# Patient Record
Sex: FEMALE | Race: ASIAN | NOT HISPANIC OR LATINO
[De-identification: names, ages, dates, MRNs, and addresses within clinical notes are randomized per-mention and may not be internally consistent; named-entity substitution may affect disease eponyms.]

---

## 2020-10-21 ENCOUNTER — TRANSCRIPTION ENCOUNTER (OUTPATIENT)
Age: 35
End: 2020-10-21

## 2021-07-31 ENCOUNTER — APPOINTMENT (OUTPATIENT)
Dept: OBGYN | Facility: CLINIC | Age: 36
End: 2021-07-31
Payer: COMMERCIAL

## 2021-07-31 ENCOUNTER — NON-APPOINTMENT (OUTPATIENT)
Age: 36
End: 2021-07-31

## 2021-07-31 PROCEDURE — 99385 PREV VISIT NEW AGE 18-39: CPT

## 2021-07-31 PROCEDURE — 36415 COLL VENOUS BLD VENIPUNCTURE: CPT

## 2021-08-12 ENCOUNTER — APPOINTMENT (OUTPATIENT)
Dept: OBGYN | Facility: CLINIC | Age: 36
End: 2021-08-12
Payer: COMMERCIAL

## 2021-08-12 DIAGNOSIS — Z87.42 PERSONAL HISTORY OF OTHER DISEASES OF THE FEMALE GENITAL TRACT: ICD-10-CM

## 2021-08-12 DIAGNOSIS — O09.819 SUPERVISION OF PREGNANCY RESULTING FROM ASSISTED REPRODUCTIVE TECHNOLOGY, UNSPECIFIED TRIMESTER: ICD-10-CM

## 2021-08-12 DIAGNOSIS — Z78.9 OTHER SPECIFIED HEALTH STATUS: ICD-10-CM

## 2021-08-12 DIAGNOSIS — N91.1 SECONDARY AMENORRHEA: ICD-10-CM

## 2021-08-12 DIAGNOSIS — Z82.49 FAMILY HISTORY OF ISCHEMIC HEART DISEASE AND OTHER DISEASES OF THE CIRCULATORY SYSTEM: ICD-10-CM

## 2021-08-12 PROBLEM — Z00.00 ENCOUNTER FOR PREVENTIVE HEALTH EXAMINATION: Status: ACTIVE | Noted: 2021-08-12

## 2021-08-12 PROCEDURE — 76801 OB US < 14 WKS SINGLE FETUS: CPT

## 2021-08-12 RX ORDER — PROGESTERONE 200 MG/1
CAPSULE ORAL
Refills: 0 | Status: ACTIVE | COMMUNITY

## 2021-08-28 ENCOUNTER — APPOINTMENT (OUTPATIENT)
Dept: ANTEPARTUM | Facility: CLINIC | Age: 36
End: 2021-08-28
Payer: COMMERCIAL

## 2021-08-28 PROCEDURE — 76801 OB US < 14 WKS SINGLE FETUS: CPT

## 2021-08-28 PROCEDURE — 76816 OB US FOLLOW-UP PER FETUS: CPT

## 2021-08-28 PROCEDURE — 76813 OB US NUCHAL MEAS 1 GEST: CPT | Mod: 59

## 2021-09-25 ENCOUNTER — TRANSCRIPTION ENCOUNTER (OUTPATIENT)
Age: 36
End: 2021-09-25

## 2021-09-25 ENCOUNTER — APPOINTMENT (OUTPATIENT)
Dept: OBGYN | Facility: CLINIC | Age: 36
End: 2021-09-25

## 2021-09-25 VITALS — SYSTOLIC BLOOD PRESSURE: 102 MMHG | WEIGHT: 116 LBS | DIASTOLIC BLOOD PRESSURE: 66 MMHG

## 2021-09-25 DIAGNOSIS — Z34.00 ENCOUNTER FOR SUPERVISION OF NORMAL FIRST PREGNANCY, UNSPECIFIED TRIMESTER: ICD-10-CM

## 2021-09-30 LAB
2ND TRIMESTER DATA: NORMAL
AFP PNL SERPL: NORMAL
AFP SERPL-ACNC: NORMAL
CLINICAL BIOCHEMIST REVIEW: NORMAL
NOTES NTD: NORMAL

## 2021-10-25 ENCOUNTER — APPOINTMENT (OUTPATIENT)
Dept: ANTEPARTUM | Facility: CLINIC | Age: 36
End: 2021-10-25
Payer: COMMERCIAL

## 2021-10-25 ENCOUNTER — APPOINTMENT (OUTPATIENT)
Dept: OBGYN | Facility: CLINIC | Age: 36
End: 2021-10-25
Payer: COMMERCIAL

## 2021-10-25 VITALS
WEIGHT: 128 LBS | BODY MASS INDEX: 22.68 KG/M2 | DIASTOLIC BLOOD PRESSURE: 65 MMHG | SYSTOLIC BLOOD PRESSURE: 101 MMHG | HEIGHT: 63 IN

## 2021-10-25 PROCEDURE — 90686 IIV4 VACC NO PRSV 0.5 ML IM: CPT

## 2021-10-25 PROCEDURE — 90471 IMMUNIZATION ADMIN: CPT

## 2021-10-25 PROCEDURE — ZZZZZ: CPT

## 2021-10-25 PROCEDURE — 76811 OB US DETAILED SNGL FETUS: CPT

## 2021-11-20 ENCOUNTER — APPOINTMENT (OUTPATIENT)
Dept: OBGYN | Facility: CLINIC | Age: 36
End: 2021-11-20

## 2021-11-20 VITALS
SYSTOLIC BLOOD PRESSURE: 99 MMHG | WEIGHT: 131 LBS | DIASTOLIC BLOOD PRESSURE: 66 MMHG | HEIGHT: 63 IN | BODY MASS INDEX: 23.21 KG/M2

## 2021-11-22 LAB
BASOPHILS # BLD AUTO: 0.04 K/UL
BASOPHILS NFR BLD AUTO: 0.5 %
EOSINOPHIL # BLD AUTO: 0.17 K/UL
EOSINOPHIL NFR BLD AUTO: 2 %
GLUCOSE 1H P 50 G GLC PO SERPL-MCNC: 91 MG/DL
HCT VFR BLD CALC: 34.1 %
HGB BLD-MCNC: 11.5 G/DL
IMM GRANULOCYTES NFR BLD AUTO: 0.5 %
LYMPHOCYTES # BLD AUTO: 1.14 K/UL
LYMPHOCYTES NFR BLD AUTO: 13.7 %
MAN DIFF?: NORMAL
MCHC RBC-ENTMCNC: 32.2 PG
MCHC RBC-ENTMCNC: 33.7 GM/DL
MCV RBC AUTO: 95.5 FL
MONOCYTES # BLD AUTO: 0.44 K/UL
MONOCYTES NFR BLD AUTO: 5.3 %
NEUTROPHILS # BLD AUTO: 6.48 K/UL
NEUTROPHILS NFR BLD AUTO: 78 %
PLATELET # BLD AUTO: 141 K/UL
RBC # BLD: 3.57 M/UL
RBC # FLD: 13.4 %
WBC # FLD AUTO: 8.31 K/UL

## 2021-12-18 ENCOUNTER — APPOINTMENT (OUTPATIENT)
Dept: OBGYN | Facility: CLINIC | Age: 36
End: 2021-12-18

## 2021-12-18 VITALS — SYSTOLIC BLOOD PRESSURE: 103 MMHG | BODY MASS INDEX: 23.91 KG/M2 | WEIGHT: 135 LBS | DIASTOLIC BLOOD PRESSURE: 69 MMHG

## 2021-12-27 ENCOUNTER — TRANSCRIPTION ENCOUNTER (OUTPATIENT)
Age: 36
End: 2021-12-27

## 2022-01-04 ENCOUNTER — APPOINTMENT (OUTPATIENT)
Dept: ANTEPARTUM | Facility: CLINIC | Age: 37
End: 2022-01-04
Payer: COMMERCIAL

## 2022-01-04 ENCOUNTER — APPOINTMENT (OUTPATIENT)
Dept: OBGYN | Facility: CLINIC | Age: 37
End: 2022-01-04
Payer: COMMERCIAL

## 2022-01-04 VITALS
HEIGHT: 63 IN | BODY MASS INDEX: 24.8 KG/M2 | WEIGHT: 140 LBS | DIASTOLIC BLOOD PRESSURE: 70 MMHG | SYSTOLIC BLOOD PRESSURE: 103 MMHG

## 2022-01-04 PROCEDURE — XXXXX: CPT

## 2022-01-04 PROCEDURE — 76819 FETAL BIOPHYS PROFIL W/O NST: CPT

## 2022-01-04 PROCEDURE — 76816 OB US FOLLOW-UP PER FETUS: CPT

## 2022-01-18 ENCOUNTER — APPOINTMENT (OUTPATIENT)
Dept: OBGYN | Facility: CLINIC | Age: 37
End: 2022-01-18
Payer: COMMERCIAL

## 2022-01-18 VITALS
BODY MASS INDEX: 25.16 KG/M2 | SYSTOLIC BLOOD PRESSURE: 108 MMHG | DIASTOLIC BLOOD PRESSURE: 70 MMHG | HEIGHT: 63 IN | WEIGHT: 142 LBS

## 2022-01-18 PROCEDURE — 90471 IMMUNIZATION ADMIN: CPT

## 2022-01-18 PROCEDURE — 90715 TDAP VACCINE 7 YRS/> IM: CPT

## 2022-02-03 ENCOUNTER — APPOINTMENT (OUTPATIENT)
Dept: OBGYN | Facility: CLINIC | Age: 37
End: 2022-02-03
Payer: COMMERCIAL

## 2022-02-03 VITALS
HEIGHT: 63 IN | WEIGHT: 147 LBS | SYSTOLIC BLOOD PRESSURE: 118 MMHG | DIASTOLIC BLOOD PRESSURE: 75 MMHG | BODY MASS INDEX: 26.05 KG/M2

## 2022-02-03 PROCEDURE — XXXXX: CPT

## 2022-02-17 ENCOUNTER — APPOINTMENT (OUTPATIENT)
Dept: OBGYN | Facility: CLINIC | Age: 37
End: 2022-02-17
Payer: COMMERCIAL

## 2022-02-17 VITALS
BODY MASS INDEX: 26.22 KG/M2 | SYSTOLIC BLOOD PRESSURE: 105 MMHG | HEIGHT: 63 IN | DIASTOLIC BLOOD PRESSURE: 72 MMHG | WEIGHT: 148 LBS

## 2022-02-17 DIAGNOSIS — Z3A.36 36 WEEKS GESTATION OF PREGNANCY: ICD-10-CM

## 2022-02-17 PROCEDURE — 36415 COLL VENOUS BLD VENIPUNCTURE: CPT

## 2022-02-17 PROCEDURE — 0502F SUBSEQUENT PRENATAL CARE: CPT

## 2022-02-22 LAB
B-HEM STREP SPEC QL CULT: NORMAL
HIV1+2 AB SPEC QL IA.RAPID: NONREACTIVE

## 2022-03-01 ENCOUNTER — APPOINTMENT (OUTPATIENT)
Dept: ANTEPARTUM | Facility: CLINIC | Age: 37
End: 2022-03-01
Payer: COMMERCIAL

## 2022-03-01 ENCOUNTER — APPOINTMENT (OUTPATIENT)
Dept: OBGYN | Facility: CLINIC | Age: 37
End: 2022-03-01
Payer: COMMERCIAL

## 2022-03-01 VITALS
SYSTOLIC BLOOD PRESSURE: 118 MMHG | DIASTOLIC BLOOD PRESSURE: 79 MMHG | BODY MASS INDEX: 26.58 KG/M2 | HEIGHT: 63 IN | WEIGHT: 150 LBS

## 2022-03-01 PROCEDURE — XXXXX: CPT

## 2022-03-01 PROCEDURE — 76816 OB US FOLLOW-UP PER FETUS: CPT

## 2022-03-01 PROCEDURE — 76819 FETAL BIOPHYS PROFIL W/O NST: CPT

## 2022-03-07 ENCOUNTER — INPATIENT (INPATIENT)
Facility: HOSPITAL | Age: 37
LOS: 4 days | Discharge: HOME | End: 2022-03-12
Attending: OBSTETRICS & GYNECOLOGY | Admitting: OBSTETRICS & GYNECOLOGY
Payer: COMMERCIAL

## 2022-03-07 ENCOUNTER — NON-APPOINTMENT (OUTPATIENT)
Age: 37
End: 2022-03-07

## 2022-03-07 VITALS
TEMPERATURE: 98 F | HEART RATE: 94 BPM | DIASTOLIC BLOOD PRESSURE: 70 MMHG | RESPIRATION RATE: 16 BRPM | OXYGEN SATURATION: 99 % | SYSTOLIC BLOOD PRESSURE: 113 MMHG

## 2022-03-07 DIAGNOSIS — O42.92 FULL-TERM PREMATURE RUPTURE OF MEMBRANES, UNSPECIFIED AS TO LENGTH OF TIME BETWEEN RUPTURE AND ONSET OF LABOR: ICD-10-CM

## 2022-03-07 DIAGNOSIS — Z3A.00 WEEKS OF GESTATION OF PREGNANCY NOT SPECIFIED: ICD-10-CM

## 2022-03-07 DIAGNOSIS — O26.899 OTHER SPECIFIED PREGNANCY RELATED CONDITIONS, UNSPECIFIED TRIMESTER: ICD-10-CM

## 2022-03-07 LAB
BASOPHILS # BLD AUTO: 0.03 K/UL — SIGNIFICANT CHANGE UP (ref 0–0.2)
BASOPHILS NFR BLD AUTO: 0.3 % — SIGNIFICANT CHANGE UP (ref 0–2)
BLD GP AB SCN SERPL QL: NEGATIVE — SIGNIFICANT CHANGE UP
EOSINOPHIL # BLD AUTO: 0.07 K/UL — SIGNIFICANT CHANGE UP (ref 0–0.5)
EOSINOPHIL NFR BLD AUTO: 0.8 % — SIGNIFICANT CHANGE UP (ref 0–6)
HCT VFR BLD CALC: 39.2 % — SIGNIFICANT CHANGE UP (ref 34.5–45)
HGB BLD-MCNC: 13.3 G/DL — SIGNIFICANT CHANGE UP (ref 11.5–15.5)
IANC: 6.61 K/UL — SIGNIFICANT CHANGE UP (ref 1.5–8.5)
IMM GRANULOCYTES NFR BLD AUTO: 0.6 % — SIGNIFICANT CHANGE UP (ref 0–1.5)
LYMPHOCYTES # BLD AUTO: 1.42 K/UL — SIGNIFICANT CHANGE UP (ref 1–3.3)
LYMPHOCYTES # BLD AUTO: 16.6 % — SIGNIFICANT CHANGE UP (ref 13–44)
MCHC RBC-ENTMCNC: 31.9 PG — SIGNIFICANT CHANGE UP (ref 27–34)
MCHC RBC-ENTMCNC: 33.9 GM/DL — SIGNIFICANT CHANGE UP (ref 32–36)
MCV RBC AUTO: 94 FL — SIGNIFICANT CHANGE UP (ref 80–100)
MONOCYTES # BLD AUTO: 0.4 K/UL — SIGNIFICANT CHANGE UP (ref 0–0.9)
MONOCYTES NFR BLD AUTO: 4.7 % — SIGNIFICANT CHANGE UP (ref 2–14)
NEUTROPHILS # BLD AUTO: 6.61 K/UL — SIGNIFICANT CHANGE UP (ref 1.8–7.4)
NEUTROPHILS NFR BLD AUTO: 77 % — SIGNIFICANT CHANGE UP (ref 43–77)
NRBC # BLD: 0 /100 WBCS — SIGNIFICANT CHANGE UP
NRBC # FLD: 0 K/UL — SIGNIFICANT CHANGE UP
PLATELET # BLD AUTO: 107 K/UL — LOW (ref 150–400)
RBC # BLD: 4.17 M/UL — SIGNIFICANT CHANGE UP (ref 3.8–5.2)
RBC # FLD: 13.3 % — SIGNIFICANT CHANGE UP (ref 10.3–14.5)
RH IG SCN BLD-IMP: POSITIVE — SIGNIFICANT CHANGE UP
RH IG SCN BLD-IMP: POSITIVE — SIGNIFICANT CHANGE UP
SARS-COV-2 RNA SPEC QL NAA+PROBE: SIGNIFICANT CHANGE UP
WBC # BLD: 8.58 K/UL — SIGNIFICANT CHANGE UP (ref 3.8–10.5)
WBC # FLD AUTO: 8.58 K/UL — SIGNIFICANT CHANGE UP (ref 3.8–10.5)

## 2022-03-07 RX ORDER — OXYTOCIN 10 UNIT/ML
333.33 VIAL (ML) INJECTION
Qty: 20 | Refills: 0 | Status: DISCONTINUED | OUTPATIENT
Start: 2022-03-07 | End: 2022-03-10

## 2022-03-07 RX ORDER — SODIUM CHLORIDE 9 MG/ML
1000 INJECTION, SOLUTION INTRAVENOUS
Refills: 0 | Status: DISCONTINUED | OUTPATIENT
Start: 2022-03-07 | End: 2022-03-09

## 2022-03-07 RX ORDER — OXYTOCIN 10 UNIT/ML
333.33 VIAL (ML) INJECTION
Qty: 20 | Refills: 0 | Status: DISCONTINUED | OUTPATIENT
Start: 2022-03-07 | End: 2022-03-07

## 2022-03-07 RX ORDER — SODIUM CHLORIDE 9 MG/ML
1000 INJECTION, SOLUTION INTRAVENOUS
Refills: 0 | Status: DISCONTINUED | OUTPATIENT
Start: 2022-03-07 | End: 2022-03-07

## 2022-03-07 RX ADMIN — SODIUM CHLORIDE 125 MILLILITER(S): 9 INJECTION, SOLUTION INTRAVENOUS at 18:52

## 2022-03-07 NOTE — OB RN TRIAGE NOTE - FALL HARM RISK - UNIVERSAL INTERVENTIONS
Bed in lowest position, wheels locked, appropriate side rails in place/Call bell, personal items and telephone in reach/Instruct patient to call for assistance before getting out of bed or chair/Non-slip footwear when patient is out of bed/[place] to call system/Physically safe environment - no spills, clutter or unnecessary equipment/Purposeful Proactive Rounding/Room/bathroom lighting operational, light cord in reach

## 2022-03-07 NOTE — OB RN PATIENT PROFILE - FALL HARM RISK - UNIVERSAL INTERVENTIONS
Bed in lowest position, wheels locked, appropriate side rails in place/Call bell, personal items and telephone in reach/Instruct patient to call for assistance before getting out of bed or chair/Non-slip footwear when patient is out of bed/[place] to call system/Physically safe environment - no spills, clutter or unnecessary equipment/Purposeful Proactive Rounding/Room/bathroom lighting operational, light cord in reach

## 2022-03-07 NOTE — OB PROVIDER TRIAGE NOTE - NSHPPHYSICALEXAM_GEN_ALL_CORE
Assessment reveals VSS, abdomen soft, NT, gravid.   SSE- clear mucus discharge noted coming from is. Appears 1cm dilated. Neg pooling, positive nitrizine, positive fern.   VE 1/50/-3  Vtx confirmed by US  EFW 8.0 by palp  Cat 1 FHT, occasional ctx on toco    Plan D/W patient, desires to not be induced at this time.   Dr. Kim to come and talk to patient.

## 2022-03-07 NOTE — OB PROVIDER TRIAGE NOTE - HISTORY OF PRESENT ILLNESS
37yo  @ 39.2 presents with c/o watery/bloody discharge since 0245am. Denies ctx and reports GFM.  Denies H/O COVID-19  Vaccinated x 3    Denies PMH/PSH

## 2022-03-07 NOTE — OB RN TRIAGE NOTE - CHIEF COMPLAINT QUOTE
spotting + fm fh in quick look 126 spotting since 2:45am + fm fh in quick look 126; small gush of fluid here in bathroom @12:45pm

## 2022-03-07 NOTE — OB PROVIDER H&P - ASSESSMENT
Admit for PPROM  Dr. Kim to discuss plan of care with patient.  Await labor at this time.   COVID-19 PCR  Pain management PRN  D/W Dr. Kim

## 2022-03-07 NOTE — OB PROVIDER H&P - HISTORY OF PRESENT ILLNESS
35yo  @ 39.2 presents with c/o watery/bloody discharge since 0245am. Denies ctx and reports GFM.  Denies H/O COVID-19  Vaccinated x 3    Denies PMH/PSH

## 2022-03-08 ENCOUNTER — TRANSCRIPTION ENCOUNTER (OUTPATIENT)
Age: 37
End: 2022-03-08

## 2022-03-08 ENCOUNTER — APPOINTMENT (OUTPATIENT)
Dept: OBGYN | Facility: CLINIC | Age: 37
End: 2022-03-08

## 2022-03-08 LAB
COVID-19 SPIKE DOMAIN AB INTERP: POSITIVE
COVID-19 SPIKE DOMAIN ANTIBODY RESULT: >250 U/ML — HIGH
HBV SURFACE AG SERPL QL IA: SIGNIFICANT CHANGE UP
HCT VFR BLD CALC: 38 % — SIGNIFICANT CHANGE UP (ref 34.5–45)
HGB BLD-MCNC: 12.7 G/DL — SIGNIFICANT CHANGE UP (ref 11.5–15.5)
MCHC RBC-ENTMCNC: 31.3 PG — SIGNIFICANT CHANGE UP (ref 27–34)
MCHC RBC-ENTMCNC: 33.4 GM/DL — SIGNIFICANT CHANGE UP (ref 32–36)
MCV RBC AUTO: 93.6 FL — SIGNIFICANT CHANGE UP (ref 80–100)
NRBC # BLD: 0 /100 WBCS — SIGNIFICANT CHANGE UP
NRBC # FLD: 0 K/UL — SIGNIFICANT CHANGE UP
PLATELET # BLD AUTO: 101 K/UL — LOW (ref 150–400)
RBC # BLD: 4.06 M/UL — SIGNIFICANT CHANGE UP (ref 3.8–5.2)
RBC # FLD: 13.3 % — SIGNIFICANT CHANGE UP (ref 10.3–14.5)
SARS-COV-2 IGG+IGM SERPL QL IA: >250 U/ML — HIGH
SARS-COV-2 IGG+IGM SERPL QL IA: POSITIVE
T PALLIDUM AB TITR SER: NEGATIVE — SIGNIFICANT CHANGE UP
WBC # BLD: 8.99 K/UL — SIGNIFICANT CHANGE UP (ref 3.8–10.5)
WBC # FLD AUTO: 8.99 K/UL — SIGNIFICANT CHANGE UP (ref 3.8–10.5)

## 2022-03-08 RX ORDER — GENTAMICIN SULFATE 40 MG/ML
340 VIAL (ML) INJECTION ONCE
Refills: 0 | Status: COMPLETED | OUTPATIENT
Start: 2022-03-08 | End: 2022-03-08

## 2022-03-08 RX ORDER — AMPICILLIN TRIHYDRATE 250 MG
2 CAPSULE ORAL EVERY 6 HOURS
Refills: 0 | Status: DISCONTINUED | OUTPATIENT
Start: 2022-03-09 | End: 2022-03-09

## 2022-03-08 RX ORDER — DIPHENHYDRAMINE HCL 50 MG
25 CAPSULE ORAL ONCE
Refills: 0 | Status: COMPLETED | OUTPATIENT
Start: 2022-03-08 | End: 2022-03-08

## 2022-03-08 RX ORDER — ACETAMINOPHEN 500 MG
975 TABLET ORAL ONCE
Refills: 0 | Status: COMPLETED | OUTPATIENT
Start: 2022-03-08 | End: 2022-03-08

## 2022-03-08 RX ORDER — AMPICILLIN TRIHYDRATE 250 MG
2 CAPSULE ORAL ONCE
Refills: 0 | Status: COMPLETED | OUTPATIENT
Start: 2022-03-08 | End: 2022-03-08

## 2022-03-08 RX ORDER — AMPICILLIN TRIHYDRATE 250 MG
CAPSULE ORAL
Refills: 0 | Status: DISCONTINUED | OUTPATIENT
Start: 2022-03-08 | End: 2022-03-09

## 2022-03-08 RX ORDER — GENTAMICIN SULFATE 40 MG/ML
80 VIAL (ML) INJECTION EVERY 8 HOURS
Refills: 0 | Status: DISCONTINUED | OUTPATIENT
Start: 2022-03-08 | End: 2022-03-08

## 2022-03-08 RX ORDER — OXYTOCIN 10 UNIT/ML
2 VIAL (ML) INJECTION
Qty: 30 | Refills: 0 | Status: DISCONTINUED | OUTPATIENT
Start: 2022-03-08 | End: 2022-03-09

## 2022-03-08 RX ADMIN — Medication 975 MILLIGRAM(S): at 18:21

## 2022-03-08 RX ADMIN — Medication 25 MILLIGRAM(S): at 23:54

## 2022-03-08 RX ADMIN — Medication 300 MILLIGRAM(S): at 19:50

## 2022-03-08 RX ADMIN — Medication 2 MILLIUNIT(S)/MIN: at 05:05

## 2022-03-08 RX ADMIN — Medication 975 MILLIGRAM(S): at 17:56

## 2022-03-08 RX ADMIN — SODIUM CHLORIDE 125 MILLILITER(S): 9 INJECTION, SOLUTION INTRAVENOUS at 17:58

## 2022-03-08 RX ADMIN — Medication 216 GRAM(S): at 18:45

## 2022-03-09 ENCOUNTER — RESULT REVIEW (OUTPATIENT)
Age: 37
End: 2022-03-09

## 2022-03-09 ENCOUNTER — TRANSCRIPTION ENCOUNTER (OUTPATIENT)
Age: 37
End: 2022-03-09

## 2022-03-09 LAB
HCT VFR BLD CALC: 24.7 % — LOW (ref 34.5–45)
HCT VFR BLD CALC: 33.4 % — LOW (ref 34.5–45)
HCT VFR BLD CALC: 36.9 % — SIGNIFICANT CHANGE UP (ref 34.5–45)
HGB BLD-MCNC: 11.5 G/DL — SIGNIFICANT CHANGE UP (ref 11.5–15.5)
HGB BLD-MCNC: 12 G/DL — SIGNIFICANT CHANGE UP (ref 11.5–15.5)
HGB BLD-MCNC: 7.6 G/DL — LOW (ref 11.5–15.5)
MCHC RBC-ENTMCNC: 24.2 PG — LOW (ref 27–34)
MCHC RBC-ENTMCNC: 30.8 GM/DL — LOW (ref 32–36)
MCHC RBC-ENTMCNC: 31.3 PG — SIGNIFICANT CHANGE UP (ref 27–34)
MCHC RBC-ENTMCNC: 31.7 PG — SIGNIFICANT CHANGE UP (ref 27–34)
MCHC RBC-ENTMCNC: 32.5 GM/DL — SIGNIFICANT CHANGE UP (ref 32–36)
MCHC RBC-ENTMCNC: 34.4 GM/DL — SIGNIFICANT CHANGE UP (ref 32–36)
MCV RBC AUTO: 78.7 FL — LOW (ref 80–100)
MCV RBC AUTO: 92 FL — SIGNIFICANT CHANGE UP (ref 80–100)
MCV RBC AUTO: 96.3 FL — SIGNIFICANT CHANGE UP (ref 80–100)
NRBC # BLD: 0 /100 WBCS — SIGNIFICANT CHANGE UP
NRBC # FLD: 0 K/UL — SIGNIFICANT CHANGE UP
NRBC # FLD: 0 K/UL — SIGNIFICANT CHANGE UP
NRBC # FLD: 0.04 K/UL — HIGH
PLATELET # BLD AUTO: 130 K/UL — LOW (ref 150–400)
PLATELET # BLD AUTO: 88 K/UL — LOW (ref 150–400)
PLATELET # BLD AUTO: 89 K/UL — LOW (ref 150–400)
RBC # BLD: 3.14 M/UL — LOW (ref 3.8–5.2)
RBC # BLD: 3.63 M/UL — LOW (ref 3.8–5.2)
RBC # BLD: 3.83 M/UL — SIGNIFICANT CHANGE UP (ref 3.8–5.2)
RBC # FLD: 13.6 % — SIGNIFICANT CHANGE UP (ref 10.3–14.5)
RBC # FLD: 13.8 % — SIGNIFICANT CHANGE UP (ref 10.3–14.5)
RBC # FLD: 17.8 % — HIGH (ref 10.3–14.5)
WBC # BLD: 10.12 K/UL — SIGNIFICANT CHANGE UP (ref 3.8–10.5)
WBC # BLD: 11.55 K/UL — HIGH (ref 3.8–10.5)
WBC # BLD: 9.34 K/UL — SIGNIFICANT CHANGE UP (ref 3.8–10.5)
WBC # FLD AUTO: 10.12 K/UL — SIGNIFICANT CHANGE UP (ref 3.8–10.5)
WBC # FLD AUTO: 11.55 K/UL — HIGH (ref 3.8–10.5)
WBC # FLD AUTO: 9.34 K/UL — SIGNIFICANT CHANGE UP (ref 3.8–10.5)

## 2022-03-09 PROCEDURE — 59514 CESAREAN DELIVERY ONLY: CPT | Mod: AS,U9

## 2022-03-09 PROCEDURE — 88307 TISSUE EXAM BY PATHOLOGIST: CPT | Mod: 26

## 2022-03-09 DEVICE — SURGICEL FIBRILLAR 1 X 2": Type: IMPLANTABLE DEVICE | Status: FUNCTIONAL

## 2022-03-09 RX ORDER — SODIUM CHLORIDE 9 MG/ML
1000 INJECTION, SOLUTION INTRAVENOUS
Refills: 0 | Status: DISCONTINUED | OUTPATIENT
Start: 2022-03-09 | End: 2022-03-10

## 2022-03-09 RX ORDER — LANOLIN
1 OINTMENT (GRAM) TOPICAL EVERY 6 HOURS
Refills: 0 | Status: DISCONTINUED | OUTPATIENT
Start: 2022-03-09 | End: 2022-03-12

## 2022-03-09 RX ORDER — SIMETHICONE 80 MG/1
80 TABLET, CHEWABLE ORAL EVERY 4 HOURS
Refills: 0 | Status: DISCONTINUED | OUTPATIENT
Start: 2022-03-09 | End: 2022-03-12

## 2022-03-09 RX ORDER — ERTAPENEM SODIUM 1 G/1
1000 INJECTION, POWDER, LYOPHILIZED, FOR SOLUTION INTRAMUSCULAR; INTRAVENOUS EVERY 24 HOURS
Refills: 0 | Status: COMPLETED | OUTPATIENT
Start: 2022-03-09 | End: 2022-03-10

## 2022-03-09 RX ORDER — MAGNESIUM HYDROXIDE 400 MG/1
30 TABLET, CHEWABLE ORAL
Refills: 0 | Status: DISCONTINUED | OUTPATIENT
Start: 2022-03-09 | End: 2022-03-12

## 2022-03-09 RX ORDER — ACETAMINOPHEN 500 MG
975 TABLET ORAL
Refills: 0 | Status: DISCONTINUED | OUTPATIENT
Start: 2022-03-09 | End: 2022-03-12

## 2022-03-09 RX ORDER — OXYTOCIN 10 UNIT/ML
333.33 VIAL (ML) INJECTION
Qty: 20 | Refills: 0 | Status: DISCONTINUED | OUTPATIENT
Start: 2022-03-09 | End: 2022-03-10

## 2022-03-09 RX ORDER — CITRIC ACID/SODIUM CITRATE 300-500 MG
30 SOLUTION, ORAL ORAL ONCE
Refills: 0 | Status: COMPLETED | OUTPATIENT
Start: 2022-03-09 | End: 2022-03-09

## 2022-03-09 RX ORDER — OXYCODONE HYDROCHLORIDE 5 MG/1
5 TABLET ORAL
Refills: 0 | Status: DISCONTINUED | OUTPATIENT
Start: 2022-03-09 | End: 2022-03-12

## 2022-03-09 RX ORDER — HEPARIN SODIUM 5000 [USP'U]/ML
5000 INJECTION INTRAVENOUS; SUBCUTANEOUS EVERY 12 HOURS
Refills: 0 | Status: DISCONTINUED | OUTPATIENT
Start: 2022-03-09 | End: 2022-03-12

## 2022-03-09 RX ORDER — DIPHENHYDRAMINE HCL 50 MG
25 CAPSULE ORAL EVERY 6 HOURS
Refills: 0 | Status: DISCONTINUED | OUTPATIENT
Start: 2022-03-09 | End: 2022-03-12

## 2022-03-09 RX ORDER — OXYCODONE HYDROCHLORIDE 5 MG/1
5 TABLET ORAL ONCE
Refills: 0 | Status: DISCONTINUED | OUTPATIENT
Start: 2022-03-09 | End: 2022-03-12

## 2022-03-09 RX ORDER — OXYTOCIN 10 UNIT/ML
2 VIAL (ML) INJECTION
Qty: 30 | Refills: 0 | Status: DISCONTINUED | OUTPATIENT
Start: 2022-03-09 | End: 2022-03-09

## 2022-03-09 RX ORDER — FAMOTIDINE 10 MG/ML
20 INJECTION INTRAVENOUS ONCE
Refills: 0 | Status: COMPLETED | OUTPATIENT
Start: 2022-03-09 | End: 2022-03-09

## 2022-03-09 RX ORDER — IBUPROFEN 200 MG
600 TABLET ORAL EVERY 6 HOURS
Refills: 0 | Status: COMPLETED | OUTPATIENT
Start: 2022-03-09 | End: 2023-02-05

## 2022-03-09 RX ORDER — KETOROLAC TROMETHAMINE 30 MG/ML
30 SYRINGE (ML) INJECTION EVERY 6 HOURS
Refills: 0 | Status: DISCONTINUED | OUTPATIENT
Start: 2022-03-09 | End: 2022-03-10

## 2022-03-09 RX ORDER — TETANUS TOXOID, REDUCED DIPHTHERIA TOXOID AND ACELLULAR PERTUSSIS VACCINE, ADSORBED 5; 2.5; 8; 8; 2.5 [IU]/.5ML; [IU]/.5ML; UG/.5ML; UG/.5ML; UG/.5ML
0.5 SUSPENSION INTRAMUSCULAR ONCE
Refills: 0 | Status: DISCONTINUED | OUTPATIENT
Start: 2022-03-09 | End: 2022-03-12

## 2022-03-09 RX ADMIN — Medication 2 MILLIUNIT(S)/MIN: at 00:56

## 2022-03-09 RX ADMIN — ERTAPENEM SODIUM 120 MILLIGRAM(S): 1 INJECTION, POWDER, LYOPHILIZED, FOR SOLUTION INTRAMUSCULAR; INTRAVENOUS at 13:00

## 2022-03-09 RX ADMIN — Medication 30 MILLIGRAM(S): at 12:40

## 2022-03-09 RX ADMIN — SODIUM CHLORIDE 75 MILLILITER(S): 9 INJECTION, SOLUTION INTRAVENOUS at 11:03

## 2022-03-09 RX ADMIN — Medication 975 MILLIGRAM(S): at 20:31

## 2022-03-09 RX ADMIN — Medication 30 MILLIGRAM(S): at 18:02

## 2022-03-09 RX ADMIN — Medication 1000 MILLIUNIT(S)/MIN: at 11:03

## 2022-03-09 RX ADMIN — Medication 975 MILLIGRAM(S): at 21:00

## 2022-03-09 RX ADMIN — Medication 30 MILLILITER(S): at 09:26

## 2022-03-09 RX ADMIN — Medication 216 GRAM(S): at 00:45

## 2022-03-09 RX ADMIN — HEPARIN SODIUM 5000 UNIT(S): 5000 INJECTION INTRAVENOUS; SUBCUTANEOUS at 17:30

## 2022-03-09 RX ADMIN — Medication 216 GRAM(S): at 06:45

## 2022-03-09 RX ADMIN — Medication 2 MILLIUNIT(S)/MIN: at 06:50

## 2022-03-09 RX ADMIN — FAMOTIDINE 20 MILLIGRAM(S): 10 INJECTION INTRAVENOUS at 09:26

## 2022-03-09 RX ADMIN — SODIUM CHLORIDE 125 MILLILITER(S): 9 INJECTION, SOLUTION INTRAVENOUS at 00:57

## 2022-03-09 NOTE — OB PROVIDER DELIVERY SUMMARY - NSPROVIDERDELIVERYNOTE_OBGYN_ALL_OB_FT
Viable female infant, apgar 9/9, weight gms  delivered @10:05  cephalic presentation; clear copious fluid noted  hysterotomy closed in single layer   Fibrillar placed over hysterotomy, fascia layer reapproximated  otherwise grossly normal uterus, tubes & ovaries    QBL: 683 cc  UOP: 300 cc  IVF: 2700 cc  Dictation Number: [id number] Viable female infant, apgar 9/9, weight 3060 gms  delivered @10:05  cephalic presentation; clear copious fluid noted  hysterotomy closed in single layer   Fibrillar placed over hysterotomy, fascia layer reapproximated  otherwise grossly normal uterus, tubes & ovaries    QBL: 683 cc  UOP: 300 cc  IVF: 2700 cc  Dictation Number: [id number]

## 2022-03-09 NOTE — OB RN DELIVERY SUMMARY - NSSELHIDDEN_OBGYN_ALL_OB_FT
[NS_DeliveryAttending1_OBGYN_ALL_OB_FT:[id number]],[NS_DeliveryAssist1_OBGYN_ALL_OB_FT:[id number]],[NS_DeliveryRN_OBGYN_ALL_OB_FT:[id number]]

## 2022-03-09 NOTE — DISCHARGE NOTE OB - CARE PROVIDER_API CALL
Evin Kim)  MaternalFetal Medicine; Obstetrics and Gynecology  [address] [place] [id number]  Phone: [phone]  Fax: [phone]  Follow Up Time:

## 2022-03-09 NOTE — OB PROVIDER LABOR PROGRESS NOTE - NS_SUBJECTIVE/OBJECTIVE_OBGYN_ALL_OB_FT
Patient evaluated bedside for labor progression. 1x deceleration noted while flat on back during examination, recovered with pausing of pitocin and lateral positioning.
Pt feeling more pressure
Patient evaluated bedside for labor progression.
patient seen and examined for cervical change
Patient seen and examined at bedside for cervical change
Patient seen and examined for cervical change
Patient seen and examined for cervical change  Vital Signs Last 24 Hrs  T(C): 36.8 (08 Mar 2022 15:16), Max: 37.3 (07 Mar 2022 20:59)  T(F): 98.24 (08 Mar 2022 15:16), Max: 99.2 (07 Mar 2022 20:59)  HR: 68 (08 Mar 2022 16:27) (58 - 134)  BP: 107/62 (08 Mar 2022 16:21) (84/54 - 170/77)  RR: 17 (08 Mar 2022 02:21) (17 - 18)  SpO2: 95% (08 Mar 2022 16:27) (92% - 99%)
Pt seen for placement of CB

## 2022-03-09 NOTE — OB RN DELIVERY SUMMARY - AS DELIV COMPLICATIONS OB
abnormal active phase labor/abnormal fetal heart rate tracing/chorioamnionitis/maternal fever/prolonged rupture of membranes/meconium stained fluid/premature rupture of membranes prior to labor

## 2022-03-09 NOTE — BRIEF OPERATIVE NOTE - OPERATION/FINDINGS
Viable female infant, apgar 9/9, weight gms  delivered @10:05  cephalic presentation; clear copious fluid noted  hysterotomy closed in single layer   Fibrillar placed over hysterotomy, fascia layer reapproximated  otherwise grossly normal uterus, tubes & ovaries    QBL: 683 cc  UOP: 300 cc  IVF: 2700 cc  Dictation Number: [id number]

## 2022-03-09 NOTE — OB PROVIDER LABOR PROGRESS NOTE - NS_OBIHIFHRDETAILS_OBGYN_ALL_OB_FT
150BPM  moderate variability  -decels
120moderate/+accels/-deceleration
135. Moderate variability. + Accels - Decels
135moderate variability/+accels/-decels
135 moderate variability/+accels/-decels
140 minimal variability/-accels/-decels
Baseline  120 Moderate variability. +Accels - Decels
CAT II FHT  deceleration x2min  moderate variability

## 2022-03-09 NOTE — DISCHARGE NOTE OB - PATIENT PORTAL LINK FT
You can access the FollowMyHealth Patient Portal offered by [facility] by registering at the following website: [email or web address]. By joining [facility]’s FollowMyHealth portal, you will also be able to view your health information using other applications (apps) compatible with our system.

## 2022-03-09 NOTE — OB RN DELIVERY SUMMARY - NS_SEPSISRSKCALC_OBGYN_ALL_OB_FT
EOS calculated successfully. EOS Risk Factor: 0.5/1000 live births ([facility] national incidence); GA=39w4d; Temp=100.4; ROM=55.333; GBS='Negative'; Antibiotics='No antibiotics or any antibiotics < 2 hrs prior to birth'   EOS calculated successfully. EOS Risk Factor: 0.5/1000 live births ([facility] national incidence); GA=39w4d; Temp=100.4; ROM=55.333; GBS='Negative'; Antibiotics='GBS specific antibiotics > 2 hrs prior to birth'

## 2022-03-09 NOTE — OB PROVIDER LABOR PROGRESS NOTE - NSVAGINALEXAM_OBGYN_ALL_OB_DT
09-Mar-2022 07:25
08-Mar-2022 23:46
09-Mar-2022 08:55
09-Mar-2022 02:30
07-Mar-2022 23:13
09-Mar-2022 04:37
08-Mar-2022 09:25
08-Mar-2022 16:06

## 2022-03-09 NOTE — OB RN DELIVERY SUMMARY - NS_LABORCHARACTER_OBGYN_ALL_OB
Induction of labor-Medicinal/Augmentation of labor/Febrile (>38C)/Antibiotics in labor/Chorioamnionitis Induction of labor-Medicinal/Augmentation of labor/Febrile (>38C)/Antibiotics in labor/Meconium staining/Chorioamnionitis

## 2022-03-09 NOTE — OB PROVIDER LABOR PROGRESS NOTE - ASSESSMENT
Cervical balloon instilled with 60/60 cc of normal saline. Placed without complication. Pt tolerated the procedure well.   Continue PO cytotec     Plan per Dr. Joanne Salinas, PGY-1 
Patient is a 35yo @39w4d PROM (245a 3/7/22) IOL, s/p PO/CB on pitocin since 545a yesterday. Course c/b IPT on A/G/T. IUPC in place however contractions inadequate. Will re-evaluate patient in 2h.    Phoebe Alarcon, PGY4  D/W Dr. Quiroz
Patient comfortable at this time with effective epidural  VE:5.5/80/-2    Vital Signs Last 24 Hrs  T(C): 36.7 (08 Mar 2022 08:33), Max: 37.3 (07 Mar 2022 20:59)  T(F): 98.06 (08 Mar 2022 08:33), Max: 99.2 (07 Mar 2022 20:59)  HR: 79 (08 Mar 2022 09:27) (58 - 134)  BP: 98/57 (08 Mar 2022 09:21) (84/54 - 128/78)  BP(mean): --  RR: 17 (08 Mar 2022 02:21) (16 - 18)  SpO2: 95% (08 Mar 2022 09:32) (92% - 99%)    Continue on Pitocin at this time  -d/w MD Mohamud  -Will reassess PRN  
Patient is a 37yo @39w4d PROM (245a 3/7/22) IOL, s/p PO/CB on pitocin since 545a yesterday. Course c/b IPT on A/G/T. IUPC in place. Exam with minimal change, though cervix feels dynamic with increased recession during contractions. Restart pitocin pending full tracing recovery.    Phoebe Alarcon, PGY4
No cervical change noted  IUPC placed   Patient remains on 8mu of pitocin   updated with plan of care    Av, NP  
Pt placed in high fowelers   Benadryl 25mg for edematous cervix   Continue pitocin     DW: Dr. Gabriella Das   
Evidence of cervical change noted  Patient to continue with pitocin augmentation; currently at 10mu  IUPC in place for pitocin titration  Will reassess PRN  D/W MD Cydney Ley, NP
No evidence of cervical change at this time  Peanut ball in place  Pitocin discontinued  D/W MD Cydney Lamas to speak with patient about operative delivery secondary to arrest of dilation      Av NP

## 2022-03-09 NOTE — BRIEF OPERATIVE NOTE - NSICDXBRIEFPOSTOP_GEN_ALL_CORE_FT
POST-OP DIAGNOSIS:  Delivery by  section using transverse incision of lower segment of uterus 09-Mar-2022 11:25:01  Saima Cedillo

## 2022-03-09 NOTE — OB PROVIDER DELIVERY SUMMARY - NSSELHIDDEN_OBGYN_ALL_OB_FT
[NS_DeliveryAttending1_OBGYN_ALL_OB_FT:[id number]],[NS_DeliveryAssist1_OBGYN_ALL_OB_FT:[id number]]

## 2022-03-09 NOTE — DISCHARGE NOTE OB - NS MD DC FALL RISK RISK
For information on Fall & Injury Prevention, visit: [email or web address] OR  [email or web address] OR  https://www.cdc.gov/steadi/patient.html

## 2022-03-09 NOTE — OB NEONATOLOGY/PEDIATRICIAN DELIVERY SUMMARY - NSPEDSNEONOTESA_OBGYN_ALL_OB_FT
Peds was called to delivery for category II tracing. Baby is a 39.4 week female born to a 35 yo  mother via primary CS. Maternal blood type B+. Mom has no PMH. Baby is a product of IVF. Pregnancy uncomplicated. GBS negative on . Prenatal labs neg/neg/I/NR. COVID negative. SROM clear at 0245 on 3/7 (~56hrs). Highest maternal temp 38.0. Received amp and gent. Amniotic fluids with thick meconium at delivery. Baby born vigorous and crying spontaneously. Warmed, dried, stimulated, suctioned. Apgars 9/9. Stools x 1. Mother plans to breastfeed and defers hep B. EOS 0.40. PMD Kasin.  Baby stable for transfer to  nursery. Parents updated.

## 2022-03-09 NOTE — BRIEF OPERATIVE NOTE - NSICDXBRIEFPREOP_GEN_ALL_CORE_FT
PRE-OP DIAGNOSIS:  Arrested labor 09-Mar-2022 11:24:06  Saima Cedillo  Abnormal fetal heart rate or rhythm affecting management of mother 09-Mar-2022 11:24:33  Saima Cedillo  Chorioamnionitis 09-Mar-2022 11:24:45  Saima Cedillo

## 2022-03-09 NOTE — DISCHARGE NOTE OB - MEDICATION SUMMARY - MEDICATIONS TO TAKE
I will START or STAY ON the medications listed below when I get home from the hospital:    ibuprofen 600 mg oral tablet  -- 1 tab(s) by mouth every 6 hours  -- Indication: For  delivery delivered    acetaminophen 500 mg oral tablet  -- 2 tab(s) by mouth every 6 hours  -- Indication: For  delivery delivered    Prena1 oral capsule  -- 1 cap(s) by mouth once a day  -- Indication: For  delivery delivered

## 2022-03-09 NOTE — OB RN INTRAOPERATIVE NOTE - NS_ORROOM _OBGYN_ALL_OB
Relevant Problems   No relevant active problems       Anesthetic History   No history of anesthetic complications            Review of Systems / Medical History  Patient summary reviewed, nursing notes reviewed and pertinent labs reviewed    Pulmonary    COPD      Smoker         Neuro/Psych   Within defined limits           Cardiovascular    Hypertension                   GI/Hepatic/Renal  Within defined limits              Endo/Other  Within defined limits           Other Findings              Physical Exam    Airway  Mallampati: II  TM Distance: > 6 cm  Neck ROM: normal range of motion   Mouth opening: Normal     Cardiovascular  Regular rate and rhythm,  S1 and S2 normal,  no murmur, click, rub, or gallop             Dental  No notable dental hx       Pulmonary  Breath sounds clear to auscultation               Abdominal  GI exam deferred       Other Findings            Anesthetic Plan    ASA: 2  Anesthesia type: general          Induction: Intravenous  Anesthetic plan and risks discussed with: Patient
3

## 2022-03-10 LAB
BASOPHILS # BLD AUTO: 0 K/UL — SIGNIFICANT CHANGE UP (ref 0–0.2)
BASOPHILS NFR BLD AUTO: 0 % — SIGNIFICANT CHANGE UP (ref 0–2)
EOSINOPHIL # BLD AUTO: 0 K/UL — SIGNIFICANT CHANGE UP (ref 0–0.5)
EOSINOPHIL NFR BLD AUTO: 0 % — SIGNIFICANT CHANGE UP (ref 0–6)
GIANT PLATELETS BLD QL SMEAR: PRESENT — SIGNIFICANT CHANGE UP
HCT VFR BLD CALC: 29.2 % — LOW (ref 34.5–45)
HGB BLD-MCNC: 9.8 G/DL — LOW (ref 11.5–15.5)
IANC: 11.07 K/UL — HIGH (ref 1.5–8.5)
LYMPHOCYTES # BLD AUTO: 0.7 K/UL — LOW (ref 1–3.3)
LYMPHOCYTES # BLD AUTO: 5.3 % — LOW (ref 13–44)
MANUAL SMEAR VERIFICATION: SIGNIFICANT CHANGE UP
MCHC RBC-ENTMCNC: 31.6 PG — SIGNIFICANT CHANGE UP (ref 27–34)
MCHC RBC-ENTMCNC: 33.6 GM/DL — SIGNIFICANT CHANGE UP (ref 32–36)
MCV RBC AUTO: 94.2 FL — SIGNIFICANT CHANGE UP (ref 80–100)
MONOCYTES # BLD AUTO: 0.48 K/UL — SIGNIFICANT CHANGE UP (ref 0–0.9)
MONOCYTES NFR BLD AUTO: 3.6 % — SIGNIFICANT CHANGE UP (ref 2–14)
NEUTROPHILS # BLD AUTO: 12.08 K/UL — HIGH (ref 1.8–7.4)
NEUTROPHILS NFR BLD AUTO: 62.8 % — SIGNIFICANT CHANGE UP (ref 43–77)
NEUTS BAND # BLD: 28.3 % — SIGNIFICANT CHANGE UP (ref 0–6)
PLAT MORPH BLD: NORMAL — SIGNIFICANT CHANGE UP
PLATELET # BLD AUTO: 82 K/UL — LOW (ref 150–400)
PLATELET COUNT - ESTIMATE: ABNORMAL
RBC # BLD: 3.1 M/UL — LOW (ref 3.8–5.2)
RBC # FLD: 13.7 % — SIGNIFICANT CHANGE UP (ref 10.3–14.5)
RBC BLD AUTO: NORMAL — SIGNIFICANT CHANGE UP
WBC # BLD: 13.26 K/UL — HIGH (ref 3.8–10.5)
WBC # FLD AUTO: 13.26 K/UL — HIGH (ref 3.8–10.5)

## 2022-03-10 RX ORDER — IBUPROFEN 200 MG
600 TABLET ORAL EVERY 6 HOURS
Refills: 0 | Status: DISCONTINUED | OUTPATIENT
Start: 2022-03-10 | End: 2022-03-12

## 2022-03-10 RX ADMIN — Medication 975 MILLIGRAM(S): at 03:12

## 2022-03-10 RX ADMIN — Medication 30 MILLIGRAM(S): at 06:10

## 2022-03-10 RX ADMIN — Medication 30 MILLIGRAM(S): at 05:38

## 2022-03-10 RX ADMIN — SIMETHICONE 80 MILLIGRAM(S): 80 TABLET, CHEWABLE ORAL at 17:24

## 2022-03-10 RX ADMIN — Medication 975 MILLIGRAM(S): at 20:41

## 2022-03-10 RX ADMIN — ERTAPENEM SODIUM 120 MILLIGRAM(S): 1 INJECTION, POWDER, LYOPHILIZED, FOR SOLUTION INTRAMUSCULAR; INTRAVENOUS at 12:38

## 2022-03-10 RX ADMIN — Medication 975 MILLIGRAM(S): at 09:24

## 2022-03-10 RX ADMIN — Medication 975 MILLIGRAM(S): at 03:42

## 2022-03-10 RX ADMIN — Medication 975 MILLIGRAM(S): at 21:20

## 2022-03-10 RX ADMIN — HEPARIN SODIUM 5000 UNIT(S): 5000 INJECTION INTRAVENOUS; SUBCUTANEOUS at 05:38

## 2022-03-10 RX ADMIN — Medication 600 MILLIGRAM(S): at 13:05

## 2022-03-10 RX ADMIN — Medication 975 MILLIGRAM(S): at 15:00

## 2022-03-10 RX ADMIN — Medication 975 MILLIGRAM(S): at 09:54

## 2022-03-10 RX ADMIN — Medication 30 MILLIGRAM(S): at 00:30

## 2022-03-10 RX ADMIN — Medication 975 MILLIGRAM(S): at 15:30

## 2022-03-10 RX ADMIN — HEPARIN SODIUM 5000 UNIT(S): 5000 INJECTION INTRAVENOUS; SUBCUTANEOUS at 17:25

## 2022-03-10 RX ADMIN — Medication 600 MILLIGRAM(S): at 17:25

## 2022-03-10 RX ADMIN — Medication 600 MILLIGRAM(S): at 12:35

## 2022-03-10 RX ADMIN — Medication 30 MILLIGRAM(S): at 00:08

## 2022-03-10 RX ADMIN — Medication 600 MILLIGRAM(S): at 17:55

## 2022-03-10 NOTE — LACTATION INITIAL EVALUATION - LACTATION INTERVENTIONS
assisted to put baby to left breast in cross cradle hold position and rt. breast in football hold position with deep latch and baby noted to suck with stimulation.  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  Pt. reassured that this is WNL./initiate/review safe skin-to-skin/initiate/review hand expression/initiate/review breast massage/compression/reviewed components of an effective feeding and at least 8 effective feedings per day required/reviewed importance of monitoring infant diapers, the breastfeeding log, and minimum output each day/reviewed risks of unnecessary formula supplementation/reviewed benefits and recommendations for rooming in/reviewed feeding on demand/by cue at least 8 times a day/reviewed indications of inadequate milk transfer that would require supplementation

## 2022-03-11 RX ORDER — ACETAMINOPHEN 500 MG
2 TABLET ORAL
Qty: 0 | Refills: 0 | DISCHARGE
Start: 2022-03-11

## 2022-03-11 RX ORDER — IBUPROFEN 200 MG
1 TABLET ORAL
Qty: 0 | Refills: 0 | DISCHARGE
Start: 2022-03-11

## 2022-03-11 RX ADMIN — Medication 975 MILLIGRAM(S): at 15:30

## 2022-03-11 RX ADMIN — Medication 600 MILLIGRAM(S): at 11:49

## 2022-03-11 RX ADMIN — Medication 600 MILLIGRAM(S): at 06:20

## 2022-03-11 RX ADMIN — Medication 975 MILLIGRAM(S): at 08:39

## 2022-03-11 RX ADMIN — Medication 600 MILLIGRAM(S): at 00:50

## 2022-03-11 RX ADMIN — HEPARIN SODIUM 5000 UNIT(S): 5000 INJECTION INTRAVENOUS; SUBCUTANEOUS at 17:46

## 2022-03-11 RX ADMIN — HEPARIN SODIUM 5000 UNIT(S): 5000 INJECTION INTRAVENOUS; SUBCUTANEOUS at 05:49

## 2022-03-11 RX ADMIN — Medication 975 MILLIGRAM(S): at 16:22

## 2022-03-11 RX ADMIN — Medication 600 MILLIGRAM(S): at 18:27

## 2022-03-11 RX ADMIN — Medication 975 MILLIGRAM(S): at 20:55

## 2022-03-11 RX ADMIN — Medication 975 MILLIGRAM(S): at 03:30

## 2022-03-11 RX ADMIN — Medication 975 MILLIGRAM(S): at 03:03

## 2022-03-11 RX ADMIN — Medication 975 MILLIGRAM(S): at 09:32

## 2022-03-11 RX ADMIN — Medication 600 MILLIGRAM(S): at 17:46

## 2022-03-11 RX ADMIN — Medication 600 MILLIGRAM(S): at 05:49

## 2022-03-11 RX ADMIN — Medication 600 MILLIGRAM(S): at 23:59

## 2022-03-11 RX ADMIN — Medication 600 MILLIGRAM(S): at 12:30

## 2022-03-11 RX ADMIN — Medication 975 MILLIGRAM(S): at 20:28

## 2022-03-11 RX ADMIN — Medication 600 MILLIGRAM(S): at 00:13

## 2022-03-11 NOTE — PROGRESS NOTE ADULT - ATTENDING COMMENTS
Pt seen and examined and agree with above assessment and plan.  Hg stable.
PAtient seen and examined  No complains  Abdomen soft and non tender  No evidence of infection  Incision healing well  Will discharge home 3/12/2022

## 2022-03-12 VITALS
RESPIRATION RATE: 18 BRPM | DIASTOLIC BLOOD PRESSURE: 65 MMHG | SYSTOLIC BLOOD PRESSURE: 125 MMHG | TEMPERATURE: 97 F | OXYGEN SATURATION: 97 % | HEART RATE: 70 BPM

## 2022-03-12 RX ADMIN — Medication 600 MILLIGRAM(S): at 00:20

## 2022-03-12 RX ADMIN — Medication 975 MILLIGRAM(S): at 03:13

## 2022-03-12 RX ADMIN — HEPARIN SODIUM 5000 UNIT(S): 5000 INJECTION INTRAVENOUS; SUBCUTANEOUS at 05:16

## 2022-03-12 RX ADMIN — Medication 975 MILLIGRAM(S): at 03:30

## 2022-03-12 RX ADMIN — Medication 600 MILLIGRAM(S): at 11:59

## 2022-03-12 RX ADMIN — Medication 600 MILLIGRAM(S): at 12:29

## 2022-03-12 RX ADMIN — Medication 600 MILLIGRAM(S): at 05:45

## 2022-03-12 RX ADMIN — Medication 975 MILLIGRAM(S): at 14:54

## 2022-03-12 RX ADMIN — Medication 600 MILLIGRAM(S): at 05:16

## 2022-03-12 NOTE — PROGRESS NOTE ADULT - SUBJECTIVE AND OBJECTIVE BOX
Postop Day  1  s/p   C- Section    THERAPY:  [ X] Epidural morphine         Sedation Score:	  [ X] Alert	    [  ] Drowsy        [  ] Arousable	[  ] Asleep	[  ] Unresponsive    Side Effects:	  [ X] None	     [  ] Nausea        [  ] Pruritus        [  ] Weakness   [  ] Numbness        ASSESSMENT/ PLAN   Change to po prn pain meds 24 hours post Epidural Morphine.  [ x ]Documentation and Verification of current medications   
S: 35yo POD#3 s/p pLTCS for arrest of dilitation c/b chorio s/p ampicillin/gentamycin/tylenol.  The patient feels well.  Pain is well controlled. She is tolerating a regular diet and passing flatus. She is voiding spontaneously, and ambulating without difficulty. Denies CP/SOB. Denies lightheadedness/dizziness. Denies N/V.          O:  Vitals:  Vital Signs Last 24 Hrs  T(C): 37 (12 Mar 2022 05:57), Max: 37 (12 Mar 2022 05:57)  T(F): 98.6 (12 Mar 2022 05:57), Max: 98.6 (12 Mar 2022 05:57)  HR: 59 (12 Mar 2022 05:57) (59 - 68)  BP: 122/77 (12 Mar 2022 05:57) (117/75 - 125/84)  BP(mean): --  RR: 18 (12 Mar 2022 05:57) (16 - 18)  SpO2: 98% (12 Mar 2022 05:57) (96% - 98%)    MEDICATIONS  (STANDING):  acetaminophen     Tablet .. 975 milliGRAM(s) Oral <User Schedule>  diphtheria/tetanus/pertussis (acellular) Vaccine (ADAcel) 0.5 milliLiter(s) IntraMuscular once  heparin   Injectable 5000 Unit(s) SubCutaneous every 12 hours  ibuprofen  Tablet. 600 milliGRAM(s) Oral every 6 hours    MEDICATIONS  (PRN):  diphenhydrAMINE 25 milliGRAM(s) Oral every 6 hours PRN Pruritus  lanolin Ointment 1 Application(s) Topical every 6 hours PRN Sore Nipples  magnesium hydroxide Suspension 30 milliLiter(s) Oral two times a day PRN Constipation  oxyCODONE    IR 5 milliGRAM(s) Oral every 3 hours PRN Moderate to Severe Pain (4-10)  oxyCODONE    IR 5 milliGRAM(s) Oral once PRN Moderate to Severe Pain (4-10)  simethicone 80 milliGRAM(s) Chew every 4 hours PRN Gas      LABS:  Blood type: B Positive  Rubella IgG: RPR: Negative                          9.8<L>   13.26<H> >-----------< 82<L>    ( 03-10 @ 07:24 )             29.2<L>                        11.5   11.55<H> >-----------< 89<L>    ( 03-09 @ 16:30 )             33.4<L>                           12.0   9.34 >-----------< 88<L>    ( 03-09 @ 13:13 )             36.9          Physical exam:  Gen: NAD  Abdomen: Soft, nontender, no distension , firm uterine fundus at umbilicus.  Incision: Clean, dry, and intact with dermabond  Pelvic: Normal lochia noted  Ext: No calf tenderness    A/P: 35yo POD#3 s/p pLTCS.  Patient is stable and is doing well post-operatively.  - Continue motrin, tylenol, oxycodone PRN for pain control.  - Increase ambulation  - Continue regular diet  - Continue iron & vitamin C  - Discharge planning    Missy HORAN        
OB Progress Note: LTCS, POD#2    S: 36y POD#2 s/p LTCS c/b chorio s/p A/G/T. Pain is well controlled. She is tolerating a regular diet and passing flatus. She is voiding spontaneously, and ambulating without difficulty. Denies CP/SOB. Denies lightheadedness/dizziness. Denies N/V. Denies any fevers or chills    O:  Vitals:  Vital Signs Last 24 Hrs  T(C): 36.4 (10 Mar 2022 22:01), Max: 36.8 (10 Mar 2022 14:32)  T(F): 97.5 (10 Mar 2022 22:01), Max: 98.2 (10 Mar 2022 14:32)  HR: 81 (10 Mar 2022 22:01) (64 - 87)  BP: 90/58 (10 Mar 2022 22:01) (90/54 - 94/55)  BP(mean): --  RR: 18 (10 Mar 2022 22:01) (16 - 18)  SpO2: 98% (10 Mar 2022 22:01) (97% - 100%)    MEDICATIONS  (STANDING):  acetaminophen     Tablet .. 975 milliGRAM(s) Oral <User Schedule>  diphtheria/tetanus/pertussis (acellular) Vaccine (ADAcel) 0.5 milliLiter(s) IntraMuscular once  heparin   Injectable 5000 Unit(s) SubCutaneous every 12 hours  ibuprofen  Tablet. 600 milliGRAM(s) Oral every 6 hours      MEDICATIONS  (PRN):  diphenhydrAMINE 25 milliGRAM(s) Oral every 6 hours PRN Pruritus  lanolin Ointment 1 Application(s) Topical every 6 hours PRN Sore Nipples  magnesium hydroxide Suspension 30 milliLiter(s) Oral two times a day PRN Constipation  oxyCODONE    IR 5 milliGRAM(s) Oral every 3 hours PRN Moderate to Severe Pain (4-10)  oxyCODONE    IR 5 milliGRAM(s) Oral once PRN Moderate to Severe Pain (4-10)  simethicone 80 milliGRAM(s) Chew every 4 hours PRN Gas      Labs:  Blood type: B Positive  Rubella IgG: RPR: Negative                          9.8<L>   13.26<H> >-----------< 82<L>    ( 03-10 @ 07:24 )             29.2<L>                        11.5   11.55<H> >-----------< 89<L>    ( 03-09 @ 16:30 )             33.4<L>                        Note   Note >-----------< Note    ( 03-09 @ 15:44 )             Note                        12.0   9.34 >-----------< 88<L>    ( 03-09 @ 13:13 )             36.9        PE:  General: NAD  Abdomen: Incision c/d/i. Appropriately tender diffusely. Soft abdomen   Extremities: No calf tenderness bilaterally.     
S: 37yo POD#1 s/p LTCS complicated by chorioamnionitis. Pt feels well without fevers/ chills. Her pain is well controlled. She is tolerating a regular diet and passing flatus. Denies N/V. Denies CP/SOB/lightheadedness/dizziness.  She is ambulating without difficulty.  Voiding spontaneously     O:   Vital Signs Last 24 Hrs  T(C): 36.6 (10 Mar 2022 06:01), Max: 37.4 (09 Mar 2022 07:15)  T(F): 97.9 (10 Mar 2022 06:01), Max: 99.32 (09 Mar 2022 07:15)  HR: 70 (10 Mar 2022 06:01) (60 - 91)  BP: 94/55 (10 Mar 2022 06:01) (83/45 - 122/73)  BP(mean): 72 (09 Mar 2022 16:00) (54 - 103)  RR: 18 (10 Mar 2022 06:01) (15 - 21)  SpO2: 97% (10 Mar 2022 06:01) (95% - 100%)    Labs:  Blood type: B Positive  Rubella IgG: RPR: Negative                          11.5   11.55<H> >-----------< 89<L>    ( 03-09 @ 16:30 )             33.4<L>                        Note   Note >-----------< Note    ( 03-09 @ 15:44 )             Note                        12.0   9.34 >-----------< 88<L>    ( 03-09 @ 13:13 )             36.9                        12.7   8.99 >-----------< 101<L>    ( 03-08 @ 03:18 )             38.0                        13.3   8.58 >-----------< 107<L>    ( 03-07 @ 17:27 )             39.2                  acetaminophen     Tablet .. 975 milliGRAM(s) Oral <User Schedule>  diphenhydrAMINE 25 milliGRAM(s) Oral every 6 hours PRN  diphtheria/tetanus/pertussis (acellular) Vaccine (ADAcel) 0.5 milliLiter(s) IntraMuscular once  ertapenem  IVPB 1000 milliGRAM(s) IV Intermittent every 24 hours  heparin   Injectable 5000 Unit(s) SubCutaneous every 12 hours  ibuprofen  Tablet. 600 milliGRAM(s) Oral every 6 hours  lactated ringers. 1000 milliLiter(s) IV Continuous <Continuous>  lactated ringers. 1000 milliLiter(s) IV Continuous <Continuous>  lanolin Ointment 1 Application(s) Topical every 6 hours PRN  magnesium hydroxide Suspension 30 milliLiter(s) Oral two times a day PRN  oxyCODONE    IR 5 milliGRAM(s) Oral every 3 hours PRN  oxyCODONE    IR 5 milliGRAM(s) Oral once PRN  oxytocin Infusion 333.333 milliUNIT(s)/Min IV Continuous <Continuous>  oxytocin Infusion 333.333 milliUNIT(s)/Min IV Continuous <Continuous>  simethicone 80 milliGRAM(s) Chew every 4 hours PRN      PE:  General: NAD  Abdomen: Mildly distended, appropriately tender, incision c/d/i.  Extremities: No erythema, no pitting edema

## 2022-03-15 PROBLEM — Z78.9 OTHER SPECIFIED HEALTH STATUS: Chronic | Status: ACTIVE | Noted: 2022-03-07

## 2022-03-23 ENCOUNTER — APPOINTMENT (OUTPATIENT)
Dept: OBGYN | Facility: CLINIC | Age: 37
End: 2022-03-23
Payer: COMMERCIAL

## 2022-03-23 VITALS
SYSTOLIC BLOOD PRESSURE: 117 MMHG | BODY MASS INDEX: 23.21 KG/M2 | WEIGHT: 131 LBS | DIASTOLIC BLOOD PRESSURE: 82 MMHG | HEIGHT: 63 IN

## 2022-03-23 PROCEDURE — 0503F POSTPARTUM CARE VISIT: CPT

## 2022-03-23 NOTE — HISTORY OF PRESENT ILLNESS
[Postpartum Follow Up] : postpartum follow up [Complications:___] : no complications [Delivery Date: ___] : on [unfilled] [Primary C/S] : delivered by  section [Female] : Delivery History: baby girl [Wt. ___] : weighing [unfilled] [Breastfeeding] : currently nursing [None] : No associated symptoms are reported [Clean/Dry/Intact] : clean, dry and intact [Examination Of The Breasts] : breasts are normal [Doing Well] : is doing well [No Sign of Infection] : is showing no signs of infection [FreeTextEntry8] : incision check  [de-identified] : incision healing well, no s/s of infection  [de-identified] : f/u in 6 weeks for PP visit

## 2022-04-02 LAB — SURGICAL PATHOLOGY STUDY: SIGNIFICANT CHANGE UP

## 2022-05-04 ENCOUNTER — APPOINTMENT (OUTPATIENT)
Dept: OBGYN | Facility: CLINIC | Age: 37
End: 2022-05-04
Payer: COMMERCIAL

## 2022-05-04 VITALS
SYSTOLIC BLOOD PRESSURE: 112 MMHG | DIASTOLIC BLOOD PRESSURE: 77 MMHG | BODY MASS INDEX: 23.04 KG/M2 | WEIGHT: 130 LBS | HEIGHT: 63 IN

## 2022-05-04 PROCEDURE — 0503F POSTPARTUM CARE VISIT: CPT

## 2022-05-04 NOTE — HISTORY OF PRESENT ILLNESS
[Complications:___] : no complications [Delivery Date: ___] : on [unfilled] [Primary C/S] : delivered by  section [Male] : Delivery History: baby boy [Breastfeeding] : currently nursing [BF with Difficulty] : nursing without difficulty [Clean/Dry/Intact] : clean, dry and intact [Healed] : healed [Back to Normal] : is back to normal in size [Normal] : the vagina was normal [Examination Of The Breasts] : breasts are normal [Doing Well] : is doing well [No Sign of Infection] : is showing no signs of infection [None] : None [FreeTextEntry8] : PP visit [de-identified] : PP exam wnl  [de-identified] : MOM AND  DOING WELL. BREASTFEEDING GOING WELL WITHOUT COMPLAINTS. CONTRACEPTION: CONDOMS, DISCUSSED OTHER OPTIONS, PATIENT AND  WILL DISCUSS AND IF THEY DECIDE ON OTHER FORMS WILL CALL OR RETRUN TO THE OFFICE, F/U IN 3-4 MONTHS FOR ANNUAL VISIT

## 2022-08-05 NOTE — OB PROVIDER IHI INDUCTION/AUGMENTATION NOTE - NS_OBIHIREPANPSATTEST_OBGYN_ALL_OB
I have discussed with the Attending the patient's status, as well as the H&P and the fetal status. The Attending agreed with the suggested management.
36.2

## 2022-08-25 ENCOUNTER — APPOINTMENT (OUTPATIENT)
Dept: OBGYN | Facility: CLINIC | Age: 37
End: 2022-08-25

## 2022-08-25 VITALS
SYSTOLIC BLOOD PRESSURE: 105 MMHG | HEIGHT: 63 IN | WEIGHT: 134 LBS | DIASTOLIC BLOOD PRESSURE: 69 MMHG | BODY MASS INDEX: 23.74 KG/M2

## 2022-08-25 DIAGNOSIS — Z01.419 ENCOUNTER FOR GYNECOLOGICAL EXAMINATION (GENERAL) (ROUTINE) W/OUT ABNORMAL FINDINGS: ICD-10-CM

## 2022-08-25 PROCEDURE — 99395 PREV VISIT EST AGE 18-39: CPT

## 2022-08-25 NOTE — PLAN
[FreeTextEntry1] : 37 year old P1 presenting for annual exam.\par -uterus is normal sized and mobile\par -f/u PAP and GC/CT done today\par -contraception: none \par -f/u PRN

## 2022-08-25 NOTE — HISTORY OF PRESENT ILLNESS
[Currently Active] : currently active [Men] : men [No] : No [FreeTextEntry1] : Patient is a 37 year old P1 presenting for an annual visit. LMP 7/27/22. She is feeling well and is without complaints. She denies vaginal itching, odor and discharge. Denies urinary urgency, frequency and dysuria. She just gave birth 5 months ago. Pt is sporadically breastfeeding.

## 2022-09-01 LAB
C TRACH RRNA SPEC QL NAA+PROBE: NOT DETECTED
CYTOLOGY CVX/VAG DOC THIN PREP: NORMAL
HPV HIGH+LOW RISK DNA PNL CVX: NOT DETECTED
N GONORRHOEA RRNA SPEC QL NAA+PROBE: NOT DETECTED
SOURCE AMPLIFICATION: NORMAL

## 2024-01-01 NOTE — OB RN PREOPERATIVE CHECKLIST - VIA
Problem: Infant Inpatient Plan of Care  Goal: Plan of Care Review  2024 1606 by Joleen Hernandez RN  Outcome: Ongoing, Progressing  Flowsheets (Taken 2024 1606)  Progress: improving  Outcome Evaluation:   infant transitioning   cpap initially and was monitored in the nicu, MD cleared infant to return to mobs room and feed   PO feeding regular similac  Care Plan Reviewed With: mother  2024 1605 by Joleen Hernandez RN  Outcome: Ongoing, Progressing  Goal: Patient-Specific Goal (Individualized)  2024 1606 by Joleen Hernandez RN  Outcome: Ongoing, Progressing  2024 1605 by Joleen Hernandez RN  Outcome: Ongoing, Progressing  Goal: Absence of Hospital-Acquired Illness or Injury  2024 1606 by Joleen Hernandez RN  Outcome: Ongoing, Progressing  2024 1605 by Joleen Hernandez RN  Outcome: Ongoing, Progressing  Goal: Optimal Comfort and Wellbeing  2024 1606 by Joleen Hernandez RN  Outcome: Ongoing, Progressing  2024 1605 by Joleen Hernandez RN  Outcome: Ongoing, Progressing  Goal: Readiness for Transition of Care  2024 1606 by Joleen Hernandez RN  Outcome: Ongoing, Progressing  2024 1605 by Joleen Hernandez RN  Outcome: Ongoing, Progressing   Goal Outcome Evaluation:           Progress: improving  Outcome Evaluation: infant transitioning; cpap initially and was monitored in the nicu, MD cleared infant to return to mobs room and feed; PO feeding regular similac                                stretcher

## 2024-04-23 NOTE — DISCHARGE NOTE OB - SWOLLEN AREA ON THE LEG THAT IS PAINFUL, RED OR HOT
Left message to schedule surgery.  Denial was overturned and approved by insurance.   MyChart message sent.  
Spoke with patient today regarding surgery scheduling      Went over details/instructions.    Surgery Letter sent via [employer]  (Please see LETTERS TAB in chart to retrieve a copy of this letter)    
Statement Selected

## 2024-09-06 NOTE — OB NEONATOLOGY/PEDIATRICIAN DELIVERY SUMMARY - NSSUCTIONBYPEDSA_OBGYN_ALL_OB
GENERAL: Awake, alert, NAD  HEAD: NC/AT, neck supple, moist mucous membranes  EYES: PERRL, EOM grossly intact, sclera anicteric  LUNGS: normal WOB on RA, CTAB, no wheezes or crackles   CARDIAC: tachycardic to 120s on monitor, irregular rhythm no murmur  ABDOMEN: Soft, non tender, non distended, no rebound, no guarding  EXT: No edema, no calf tenderness, no deformities.  NEURO: A&Ox3. Moving all extremities.  SKIN: Warm and dry. No rash.  PSYCH: Normal affect. Mouth

## 2025-01-23 NOTE — OB RN INTRAOPERATIVE NOTE - NSSELHIDDEN_OBGYN_ALL_OB_FT
----- Message from Gisela Daly MD sent at 1/22/2025 10:03 PM CST -----  Normal labs    [NS_DeliveryAttending1_OBGYN_ALL_OB_FT:[id number]],[NS_DeliveryAssist1_OBGYN_ALL_OB_FT:[id number]],[NS_DeliveryRN_OBGYN_ALL_OB_FT:[id number]]

## (undated) DEVICE — DRSG DERMABOND PRINEO 22CM